# Patient Record
Sex: MALE | Race: WHITE
[De-identification: names, ages, dates, MRNs, and addresses within clinical notes are randomized per-mention and may not be internally consistent; named-entity substitution may affect disease eponyms.]

---

## 2017-09-03 ENCOUNTER — HOSPITAL ENCOUNTER (EMERGENCY)
Dept: HOSPITAL 54 - ER | Age: 27
Discharge: HOME | End: 2017-09-03
Payer: COMMERCIAL

## 2017-09-03 VITALS — WEIGHT: 122 LBS | BODY MASS INDEX: 19.61 KG/M2 | HEIGHT: 66 IN

## 2017-09-03 VITALS — SYSTOLIC BLOOD PRESSURE: 116 MMHG | DIASTOLIC BLOOD PRESSURE: 74 MMHG
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
## 2017-09-03 DIAGNOSIS — Z91.02: ICD-10-CM

## 2017-09-03 DIAGNOSIS — S05.01XA: Primary | ICD-10-CM

## 2017-09-03 PROCEDURE — A4606 OXYGEN PROBE USED W OXIMETER: HCPCS

## 2017-09-03 PROCEDURE — 90715 TDAP VACCINE 7 YRS/> IM: CPT

## 2017-09-03 PROCEDURE — Z7610: HCPCS

## 2017-09-03 PROCEDURE — 99283 EMERGENCY DEPT VISIT LOW MDM: CPT

## 2017-09-03 PROCEDURE — 90471 IMMUNIZATION ADMIN: CPT

## 2017-09-03 NOTE — NUR
Patient discharged to home in stable condition. Written and verbal after care 
instructions given. Patient verbalizes understanding of instruction. pt 
ambulatory with a steady gait

## 2017-09-03 NOTE — NUR
PT BIB SELF FROM HOME, PT C/O RIGHT EYE PAIN X 7 HOURS. PT AOX3 RR EVEN AND 
UNLABORED. NO SOB NOTED. NAD NOTED. NO NVD AT THIS TIME. PT WAITING FOR MD SOTO.

## 2017-09-07 ENCOUNTER — HOSPITAL ENCOUNTER (EMERGENCY)
Dept: HOSPITAL 10 - FTE | Age: 27
Discharge: HOME | End: 2017-09-07
Payer: COMMERCIAL

## 2017-09-07 VITALS
HEIGHT: 66 IN | HEIGHT: 66 IN | BODY MASS INDEX: 19.31 KG/M2 | BODY MASS INDEX: 19.31 KG/M2 | WEIGHT: 120.15 LBS | WEIGHT: 120.15 LBS

## 2017-09-07 DIAGNOSIS — B00.52: Primary | ICD-10-CM

## 2017-09-07 PROCEDURE — 99283 EMERGENCY DEPT VISIT LOW MDM: CPT
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
## 2017-09-07 NOTE — ERD
ER Documentation


Chief Complaint


Date/Time


DATE: 9/7/17 


TIME: 16:30


Chief Complaint


right eye pain and swelling from corneal abrasion on 4 days ago





HPI


Patient is a 27-year-old male who presents with sudden onset, constant, 

progressive pain to his right eye for 5 days.  He states that he had a sudden 

foreign body sensation in his eye was irritated.  Over time it has become more 

swollen and painful.  He saw a he went to an emergency department 4 days ago, 

and was prescribed gentamicin eyedrops.  He states that the pain is getting 

worse despite the use of this medication.  He denies significant visual 

impairment.  He denies eye discharge.  He denies pain with eye movement.  He 

denies identified foreign body or trauma.





ROS


All systems reviewed and are negative except as per history of present illness.





Medications


Home Meds


Discontinued Scripts


Ibuprofen* (Motrin*) 600 Mg Tab, 600 MG PO Q6, #30 TAB


   Prov:MARIA LUISA SHAFFER PA-C         9/7/17


Erythromycin (Erythromycin Opth) 3.5 Gm Oint..gm., 1 APPLIC RIGHT EYE QID, #1


   Prov:MARIA LUISA SHAFFER PA-C         9/7/17





Allergies


Allergies:  


Coded Allergies:  


     No Known Allergy (Unverified , 9/7/17)





PMhx/Soc


Past medical history: None


Past surgical history: None


Social history: Denies tobacco or alcohol


Medical and Surgical Hx:  pt denies Medical Hx, pt denies Surgical Hx


Hx Alcohol Use:  No


Hx Substance Use:  No


Hx Tobacco Use:  No


Smoking Status:  Never smoker





FmHx


Family History:  No coronary disease, No diabetes





Physical Exam


Vitals





Vital Signs








  Date Time  Temp Pulse Resp B/P Pulse Ox O2 Delivery O2 Flow Rate FiO2


 


9/7/17 13:58 98.3 78 16 128/71 96   








Physical Exam


Const:     Alert, no acute distress


Head:   Atraumatic 


Eyes:   Right periorbital edema, mild.  Moderate conjunctival injection.  

Dendritic lesion with fluid are seen staining around 9 to 12:00 on right 

cornea.  Anterior chamber deep and quiet.  Pupil round and reactive.  Visual 

acuity 20/40 right eye, 20/30 left eye.  Yusuf 22.  Extraocular movements 

intact.No pain with eye movement.


ENT:    Normal External Ears, Nose and Mouth.


Resp:    Clear to auscultation bilaterally


Cardio:    Regular rate and rhythm, no murmurs


Abd:    Soft, non tender, non distended. Normal bowel sounds


Skin:    No petechiae or rashes


Back:    No midline or flank tenderness


Ext:    No cyanosis, or edema


Neur:    Awake and alert


Psych:    Normal Mood and Affect


Results 24 hrs





 Current Medications








 Medications


  (Trade)  Dose


 Ordered  Sig/Lashay


 Route


 PRN Reason  Start Time


 Stop Time Status Last Admin


Dose Admin


 


 Tetracaine HCl


  (Tetracaine 0.5%


 Steri-Unit Sol)  1 drop  ONCE  ONCE


 BOTH EYES


   9/7/17 15:00


 9/7/17 15:01 DC  


 


 


 Fluorescein Sodium


  (Fluor-I-Strip)  1 strip  ONCE  ONCE


 RIGHT EYE


   9/7/17 15:00


 9/7/17 15:01 DC  


 











Procedures/MDM


MDM: Patient is a 27-year-old male who presents with pain to right eye for 4-5 

days with foreign body sensation.  There is no foreign body or trauma 

identified.  The patient was treated with gentamicin for corneal abrasion, but 

at this time I have concerned that there may be a dendritic lesion on the 

cornea.  There is no vesicular rash on the face or scalp.  Patient has no 

systemic symptoms.  Vision is well preserved.  Vision with pinhole was not able 

to be performed due to lack of pinhole.  Exam was somewhat limited by lack of a 

working slit lamp.  I spoke with Dr. Jarvis, ophthalmologist authorized to 

see the patient, and she stated that she would see him in the morning at 8:30 

AM tomorrow.  She recommended changing the patient to Cipro drops but deferring 

antiviral medication.  Patient will be discharged with strict return 

precautions and advised of the importance of keeping his appointment tomorrow.





Departure


Diagnosis:  


 Primary Impression:  


 Dendritic keratitis


Condition:  Stable


Referrals:  


[facility]


YOU HAVE RECEIVED A MEDICAL SCREENING EXAM AND THE RESULTS INDICATE THAT YOU DO 

NOT HAVE A CONDITION THAT REQUIRES URGENT TREATMENT IN THE EMERGENCY DEPARTMENT.





FURTHER EVALUATION AND TREATMENT OF YOUR CONDITION CAN WAIT UNTIL YOU ARE SEEN 

IN YOUR DOCTORS OFFICE WITHIN THE NEXT 1-2 DAYS. IT IS YOUR RESPONSIBILITY TO 

MAKE AN APPOINTMENT FOR FOLOW-UP CARE.





IF YOU HAVE A PRIMARY DOCTOR


--you should call your primary doctor and schedule an appointment





IF YOU DO NOT HAVE A PRIMARY DOCTOR YOU CAN CALL OUR PHYSICIAN REFERRAL HOTLINE 

AT


 [phone] 





IF YOU CAN NOT AFFORD TO SEE A PHYSICIAN YOU CAN CHOSE FROM THE FOLLOWING 

[facility] CLINICS





[facility] [phone] [address] [place]





[facility] [phone] [address] [facility]





[place] [phone] [address] [facility] [phone] [address] [facility] [phone] [facility]. [facility] [phone] [address] [facility] [phone] [address] [facility] [phone] [facility]. [facility] [phone] [address] [facility]


YOU HAVE RECEIVED A MEDICAL SCREENING EXAM AND THE RESULTS INDICATE THAT YOU DO 

NOT HAVE A CONDITION THAT REQUIRES URGENT TREATMENT IN THE EMERGENCY DEPARTMENT.





FURTHER EVALUATION AND TREATMENT OF YOUR CONDITION CAN WAIT UNTIL YOU ARE SEEN 

IN YOUR DOCTORS OFFICE WITHIN THE NEXT 1-2 DAYS. IT IS YOUR RESPONSIBILITY TO 

MAKE AN APPOINTMENT FOR FOLOW-UP CARE.





IF YOU HAVE A PRIMARY DOCTOR


--you should call your primary doctor and schedule and appointment





IF YOU DO NOT HAVE A PRIMARY DOCTOR YOU CAN CALL OUR PHYSICIAN REFERRAL HOTLINE 

AT [phone].





IF YOU CAN NOT AFFORD TO SEE A PHYSICIAN YOU CAN CHOSE FROM THE FOLLOWING 

[facility] INSTITUTIONS:





[facility]


[address]


[address] [place], [place] [address] [place] [address]


[address] [place], [place] [address] [place] [address]


[address] + [facility]


[address]


Hours: Mon - Fri


9:00 AM - 5:00 PM


9:00 AM - 5:00 PM











ROBIN GALEAS MD Sep 7, 2017 16:40

## 2017-10-04 ENCOUNTER — HOSPITAL ENCOUNTER (EMERGENCY)
Dept: HOSPITAL 54 - ER | Age: 27
Discharge: LEFT BEFORE DISCHARGE | End: 2017-10-04
Payer: MEDICAID

## 2017-10-04 DIAGNOSIS — Z53.21: Primary | ICD-10-CM

## 2019-04-07 ENCOUNTER — HOSPITAL ENCOUNTER (EMERGENCY)
Dept: HOSPITAL 54 - ER | Age: 29
Discharge: HOME | End: 2019-04-07
Payer: MEDICAID

## 2019-04-07 VITALS — WEIGHT: 135 LBS | BODY MASS INDEX: 21.19 KG/M2 | HEIGHT: 67 IN

## 2019-04-07 VITALS — DIASTOLIC BLOOD PRESSURE: 76 MMHG | SYSTOLIC BLOOD PRESSURE: 119 MMHG

## 2019-04-07 DIAGNOSIS — F10.10: ICD-10-CM

## 2019-04-07 DIAGNOSIS — R11.0: Primary | ICD-10-CM

## 2019-04-07 DIAGNOSIS — F41.9: ICD-10-CM

## 2019-04-07 DIAGNOSIS — R19.7: ICD-10-CM
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
## 2019-04-07 DIAGNOSIS — Z91.018: ICD-10-CM

## 2019-04-07 LAB
ALBUMIN SERPL BCP-MCNC: 3.7 G/DL (ref 3.4–5)
ALP SERPL-CCNC: 64 U/L (ref 46–116)
ALT SERPL W P-5'-P-CCNC: 18 U/L (ref 12–78)
AST SERPL W P-5'-P-CCNC: 13 U/L (ref 15–37)
BASOPHILS # BLD AUTO: 0.1 /CMM (ref 0–0.2)
BASOPHILS NFR BLD AUTO: 0.8 % (ref 0–2)
BILIRUB DIRECT SERPL-MCNC: 0.1 MG/DL (ref 0–0.2)
BILIRUB SERPL-MCNC: 0.2 MG/DL (ref 0.2–1)
BUN SERPL-MCNC: 15 MG/DL (ref 7–18)
CALCIUM SERPL-MCNC: 8.6 MG/DL (ref 8.5–10.1)
CHLORIDE SERPL-SCNC: 103 MMOL/L (ref 98–107)
CO2 SERPL-SCNC: 29 MMOL/L (ref 21–32)
CREAT SERPL-MCNC: 1 MG/DL (ref 0.6–1.3)
EOSINOPHIL NFR BLD AUTO: 3.9 % (ref 0–6)
GLUCOSE SERPL-MCNC: 90 MG/DL (ref 74–106)
HCT VFR BLD AUTO: 40 % (ref 39–51)
HGB BLD-MCNC: 13.5 G/DL (ref 13.5–17.5)
LIPASE SERPL-CCNC: 204 U/L (ref 73–393)
LYMPHOCYTES NFR BLD AUTO: 1.9 /CMM (ref 0.8–4.8)
LYMPHOCYTES NFR BLD AUTO: 23.1 % (ref 20–44)
MCHC RBC AUTO-ENTMCNC: 34 G/DL (ref 31–36)
MCV RBC AUTO: 94 FL (ref 80–96)
MONOCYTES NFR BLD AUTO: 0.5 /CMM (ref 0.1–1.3)
MONOCYTES NFR BLD AUTO: 5.6 % (ref 2–12)
NEUTROPHILS # BLD AUTO: 5.5 /CMM (ref 1.8–8.9)
NEUTROPHILS NFR BLD AUTO: 66.6 % (ref 43–81)
PH UR STRIP: 7 [PH] (ref 5–8)
PLATELET # BLD AUTO: 303 /CMM (ref 150–450)
POTASSIUM SERPL-SCNC: 3.8 MMOL/L (ref 3.5–5.1)
PROT SERPL-MCNC: 7.1 G/DL (ref 6.4–8.2)
RBC # BLD AUTO: 4.25 MIL/UL (ref 4.5–6)
SODIUM SERPL-SCNC: 135 MMOL/L (ref 136–145)
UROBILINOGEN UR STRIP-MCNC: 0.2 EU/DL
WBC NRBC COR # BLD AUTO: 8.2 K/UL (ref 4.3–11)

## 2019-04-07 PROCEDURE — 83690 ASSAY OF LIPASE: CPT

## 2019-04-07 PROCEDURE — 96374 THER/PROPH/DIAG INJ IV PUSH: CPT

## 2019-04-07 PROCEDURE — 80048 BASIC METABOLIC PNL TOTAL CA: CPT

## 2019-04-07 PROCEDURE — 99283 EMERGENCY DEPT VISIT LOW MDM: CPT

## 2019-04-07 PROCEDURE — 96361 HYDRATE IV INFUSION ADD-ON: CPT

## 2019-04-07 PROCEDURE — 81001 URINALYSIS AUTO W/SCOPE: CPT

## 2019-04-07 PROCEDURE — 80076 HEPATIC FUNCTION PANEL: CPT

## 2019-04-07 PROCEDURE — 85025 COMPLETE CBC W/AUTO DIFF WBC: CPT

## 2019-04-07 PROCEDURE — 36415 COLL VENOUS BLD VENIPUNCTURE: CPT

## 2019-04-07 RX ADMIN — SODIUM CHLORIDE ONE ML: 9 INJECTION, SOLUTION INTRAVENOUS at 20:22

## 2019-04-07 RX ADMIN — DEXTROSE MONOHYDRATE ONE MG: 50 INJECTION, SOLUTION INTRAVENOUS at 20:22

## 2019-04-07 NOTE — NUR
BIBS. C/O "HAVING NAUSEA/VOMITING DIARRHEA FOR ABOUT A WEEK NOW, WONT GO AWAY" 
PTAOX4. AMBULATORY W.STEADY GAIT. -SOB -DIZZY. MD AT BEDSIDE